# Patient Record
Sex: FEMALE | Race: WHITE | Employment: FULL TIME | URBAN - METROPOLITAN AREA
[De-identification: names, ages, dates, MRNs, and addresses within clinical notes are randomized per-mention and may not be internally consistent; named-entity substitution may affect disease eponyms.]

---

## 2017-01-18 PROBLEM — M25.462 KNEE EFFUSION, LEFT: Status: ACTIVE | Noted: 2017-01-18

## 2017-05-25 RX ORDER — DOXEPIN HYDROCHLORIDE 50 MG/1
1 CAPSULE ORAL DAILY
COMMUNITY
End: 2017-06-15

## 2017-05-25 RX ORDER — IBUPROFEN 200 MG
200 TABLET ORAL EVERY 6 HOURS PRN
COMMUNITY
End: 2017-06-15

## 2017-05-25 RX ORDER — NAPROXEN 250 MG/1
250 TABLET ORAL AS NEEDED
COMMUNITY
End: 2017-06-15

## 2017-06-15 PROCEDURE — 87624 HPV HI-RISK TYP POOLED RSLT: CPT | Performed by: INTERNAL MEDICINE

## 2017-06-15 PROCEDURE — 88175 CYTOPATH C/V AUTO FLUID REDO: CPT | Performed by: INTERNAL MEDICINE

## 2017-06-26 ENCOUNTER — APPOINTMENT (OUTPATIENT)
Dept: LAB | Facility: HOSPITAL | Age: 62
End: 2017-06-26
Attending: ORTHOPAEDIC SURGERY
Payer: COMMERCIAL

## 2017-06-26 ENCOUNTER — HOSPITAL ENCOUNTER (OUTPATIENT)
Dept: PHYSICAL THERAPY | Facility: HOSPITAL | Age: 62
Discharge: HOME | End: 2017-06-26
Attending: ORTHOPAEDIC SURGERY
Payer: COMMERCIAL

## 2017-06-26 DIAGNOSIS — M17.12 OSTEOARTHRITIS OF LEFT KNEE: ICD-10-CM

## 2017-06-26 PROCEDURE — 86850 RBC ANTIBODY SCREEN: CPT

## 2017-06-26 PROCEDURE — 85610 PROTHROMBIN TIME: CPT

## 2017-06-26 PROCEDURE — 85025 COMPLETE CBC W/AUTO DIFF WBC: CPT

## 2017-06-26 PROCEDURE — 80053 COMPREHEN METABOLIC PANEL: CPT

## 2017-06-26 PROCEDURE — 93010 ELECTROCARDIOGRAM REPORT: CPT | Performed by: INTERNAL MEDICINE

## 2017-06-26 PROCEDURE — 86901 BLOOD TYPING SEROLOGIC RH(D): CPT

## 2017-06-26 PROCEDURE — 86900 BLOOD TYPING SEROLOGIC ABO: CPT

## 2017-06-26 PROCEDURE — 87081 CULTURE SCREEN ONLY: CPT

## 2017-06-26 PROCEDURE — 93005 ELECTROCARDIOGRAM TRACING: CPT

## 2017-06-26 PROCEDURE — 36415 COLL VENOUS BLD VENIPUNCTURE: CPT

## 2017-06-26 PROCEDURE — 85730 THROMBOPLASTIN TIME PARTIAL: CPT

## 2017-07-10 ENCOUNTER — ANESTHESIA (OUTPATIENT)
Dept: SURGERY | Facility: HOSPITAL | Age: 62
DRG: 470 | End: 2017-07-10
Payer: COMMERCIAL

## 2017-07-10 ENCOUNTER — ANESTHESIA EVENT (OUTPATIENT)
Dept: SURGERY | Facility: HOSPITAL | Age: 62
DRG: 470 | End: 2017-07-10
Payer: COMMERCIAL

## 2017-07-10 ENCOUNTER — APPOINTMENT (OUTPATIENT)
Dept: GENERAL RADIOLOGY | Facility: HOSPITAL | Age: 62
DRG: 470 | End: 2017-07-10
Attending: ORTHOPAEDIC SURGERY
Payer: COMMERCIAL

## 2017-07-10 ENCOUNTER — HOSPITAL ENCOUNTER (INPATIENT)
Facility: HOSPITAL | Age: 62
LOS: 3 days | Discharge: HOME HEALTH | DRG: 470 | End: 2017-07-13
Attending: ORTHOPAEDIC SURGERY | Admitting: ORTHOPAEDIC SURGERY
Payer: COMMERCIAL

## 2017-07-10 ENCOUNTER — SURGERY (OUTPATIENT)
Age: 62
End: 2017-07-10

## 2017-07-10 DIAGNOSIS — M17.12 OSTEOARTHRITIS OF LEFT KNEE: Primary | ICD-10-CM

## 2017-07-10 PROCEDURE — 76942 ECHO GUIDE FOR BIOPSY: CPT | Performed by: ORTHOPAEDIC SURGERY

## 2017-07-10 PROCEDURE — 0SRD0J9 REPLACEMENT OF LEFT KNEE JOINT WITH SYNTHETIC SUBSTITUTE, CEMENTED, OPEN APPROACH: ICD-10-PCS | Performed by: ORTHOPAEDIC SURGERY

## 2017-07-10 PROCEDURE — 97161 PT EVAL LOW COMPLEX 20 MIN: CPT

## 2017-07-10 PROCEDURE — 97530 THERAPEUTIC ACTIVITIES: CPT

## 2017-07-10 PROCEDURE — 3E0T3CZ INTRODUCTION OF REGIONAL ANESTHETIC INTO PERIPHERAL NERVES AND PLEXI, PERCUTANEOUS APPROACH: ICD-10-PCS | Performed by: ANESTHESIOLOGY

## 2017-07-10 PROCEDURE — 88311 DECALCIFY TISSUE: CPT | Performed by: ORTHOPAEDIC SURGERY

## 2017-07-10 PROCEDURE — 73560 X-RAY EXAM OF KNEE 1 OR 2: CPT | Performed by: ORTHOPAEDIC SURGERY

## 2017-07-10 PROCEDURE — 88305 TISSUE EXAM BY PATHOLOGIST: CPT | Performed by: ORTHOPAEDIC SURGERY

## 2017-07-10 DEVICE — ATTUNE KNEE SYSTEM FEMORAL POSTERIOR STABILIZED NARROW SIZE 5N LEFT CEMENTED
Type: IMPLANTABLE DEVICE | Site: KNEE | Status: FUNCTIONAL
Brand: ATTUNE

## 2017-07-10 DEVICE — ATTUNE KNEE SYSTEM TIBIAL INSERT ROTATING PLATFORM POSTERIOR STABILIZED 5 6MM AOX
Type: IMPLANTABLE DEVICE | Site: KNEE | Status: FUNCTIONAL
Brand: ATTUNE

## 2017-07-10 DEVICE — CEMENT BONE RADIOPAQ HOWMEDICA: Type: IMPLANTABLE DEVICE | Site: KNEE | Status: FUNCTIONAL

## 2017-07-10 DEVICE — ATTUNE PATELLA MEDIALIZED ANATOMIC 35MM CEMENTED AOX
Type: IMPLANTABLE DEVICE | Site: KNEE | Status: FUNCTIONAL
Brand: ATTUNE

## 2017-07-10 DEVICE — ATTUNE KNEE SYSTEM TIBIAL BASE ROTATING PLATFORM SIZE 3 CEMENTED
Type: IMPLANTABLE DEVICE | Site: KNEE | Status: FUNCTIONAL
Brand: ATTUNE

## 2017-07-10 DEVICE — ATTUNE PINNING SYSTEM
Type: IMPLANTABLE DEVICE | Site: KNEE | Status: FUNCTIONAL
Brand: ATTUNE

## 2017-07-10 RX ORDER — HYDROMORPHONE HYDROCHLORIDE 1 MG/ML
0.8 INJECTION, SOLUTION INTRAMUSCULAR; INTRAVENOUS; SUBCUTANEOUS EVERY 2 HOUR PRN
Status: DISCONTINUED | OUTPATIENT
Start: 2017-07-10 | End: 2017-07-11

## 2017-07-10 RX ORDER — DOCUSATE SODIUM 100 MG/1
100 CAPSULE, LIQUID FILLED ORAL 2 TIMES DAILY
Status: DISCONTINUED | OUTPATIENT
Start: 2017-07-10 | End: 2017-07-13

## 2017-07-10 RX ORDER — DIPHENHYDRAMINE HYDROCHLORIDE 50 MG/ML
12.5 INJECTION INTRAMUSCULAR; INTRAVENOUS AS NEEDED
Status: DISCONTINUED | OUTPATIENT
Start: 2017-07-10 | End: 2017-07-10 | Stop reason: HOSPADM

## 2017-07-10 RX ORDER — SODIUM PHOSPHATE, DIBASIC AND SODIUM PHOSPHATE, MONOBASIC 7; 19 G/133ML; G/133ML
1 ENEMA RECTAL ONCE AS NEEDED
Status: DISCONTINUED | OUTPATIENT
Start: 2017-07-10 | End: 2017-07-13

## 2017-07-10 RX ORDER — SENNOSIDES 8.6 MG
17.2 TABLET ORAL NIGHTLY
Status: DISCONTINUED | OUTPATIENT
Start: 2017-07-10 | End: 2017-07-13

## 2017-07-10 RX ORDER — MEPERIDINE HYDROCHLORIDE 25 MG/ML
12.5 INJECTION INTRAMUSCULAR; INTRAVENOUS; SUBCUTANEOUS AS NEEDED
Status: DISCONTINUED | OUTPATIENT
Start: 2017-07-10 | End: 2017-07-10 | Stop reason: HOSPADM

## 2017-07-10 RX ORDER — MELATONIN
325
Status: DISCONTINUED | OUTPATIENT
Start: 2017-07-11 | End: 2017-07-13

## 2017-07-10 RX ORDER — OXYCODONE HYDROCHLORIDE 5 MG/1
5 TABLET ORAL EVERY 4 HOURS PRN
Status: DISCONTINUED | OUTPATIENT
Start: 2017-07-10 | End: 2017-07-11

## 2017-07-10 RX ORDER — OXYCODONE HYDROCHLORIDE 10 MG/1
10 TABLET ORAL EVERY 4 HOURS PRN
Status: DISCONTINUED | OUTPATIENT
Start: 2017-07-10 | End: 2017-07-11

## 2017-07-10 RX ORDER — ACETAMINOPHEN 325 MG/1
TABLET ORAL
Status: COMPLETED
Start: 2017-07-10 | End: 2017-07-10

## 2017-07-10 RX ORDER — MIDAZOLAM HYDROCHLORIDE 1 MG/ML
1 INJECTION INTRAMUSCULAR; INTRAVENOUS EVERY 5 MIN PRN
Status: DISCONTINUED | OUTPATIENT
Start: 2017-07-10 | End: 2017-07-10 | Stop reason: HOSPADM

## 2017-07-10 RX ORDER — OXYCODONE HCL 10 MG/1
10 TABLET, FILM COATED, EXTENDED RELEASE ORAL
Status: DISCONTINUED | OUTPATIENT
Start: 2017-07-10 | End: 2017-07-11

## 2017-07-10 RX ORDER — ZOLPIDEM TARTRATE 5 MG/1
5 TABLET ORAL NIGHTLY PRN
Status: DISCONTINUED | OUTPATIENT
Start: 2017-07-10 | End: 2017-07-11

## 2017-07-10 RX ORDER — CYCLOBENZAPRINE HCL 5 MG
5 TABLET ORAL 3 TIMES DAILY PRN
Status: DISCONTINUED | OUTPATIENT
Start: 2017-07-10 | End: 2017-07-11

## 2017-07-10 RX ORDER — HYDROMORPHONE HYDROCHLORIDE 1 MG/ML
0.4 INJECTION, SOLUTION INTRAMUSCULAR; INTRAVENOUS; SUBCUTANEOUS EVERY 2 HOUR PRN
Status: DISCONTINUED | OUTPATIENT
Start: 2017-07-10 | End: 2017-07-12

## 2017-07-10 RX ORDER — METOCLOPRAMIDE HYDROCHLORIDE 5 MG/ML
10 INJECTION INTRAMUSCULAR; INTRAVENOUS EVERY 6 HOURS PRN
Status: DISPENSED | OUTPATIENT
Start: 2017-07-10 | End: 2017-07-12

## 2017-07-10 RX ORDER — PSEUDOEPHEDRINE HCL 30 MG
100 TABLET ORAL 2 TIMES DAILY
Qty: 60 CAPSULE | Refills: 0 | Status: SHIPPED | OUTPATIENT
Start: 2017-07-10 | End: 2017-07-28

## 2017-07-10 RX ORDER — HYDROCODONE BITARTRATE AND ACETAMINOPHEN 10; 325 MG/1; MG/1
1-2 TABLET ORAL EVERY 6 HOURS PRN
Qty: 80 TABLET | Refills: 0 | Status: SHIPPED | OUTPATIENT
Start: 2017-07-17 | End: 2017-07-12

## 2017-07-10 RX ORDER — OXYCODONE HCL 10 MG/1
10 TABLET, FILM COATED, EXTENDED RELEASE ORAL
Status: COMPLETED | OUTPATIENT
Start: 2017-07-10 | End: 2017-07-10

## 2017-07-10 RX ORDER — BISACODYL 10 MG
10 SUPPOSITORY, RECTAL RECTAL
Status: DISCONTINUED | OUTPATIENT
Start: 2017-07-10 | End: 2017-07-13

## 2017-07-10 RX ORDER — DIPHENHYDRAMINE HYDROCHLORIDE 50 MG/ML
12.5 INJECTION INTRAMUSCULAR; INTRAVENOUS EVERY 4 HOURS PRN
Status: DISCONTINUED | OUTPATIENT
Start: 2017-07-10 | End: 2017-07-12

## 2017-07-10 RX ORDER — HYDROMORPHONE HYDROCHLORIDE 1 MG/ML
0.4 INJECTION, SOLUTION INTRAMUSCULAR; INTRAVENOUS; SUBCUTANEOUS EVERY 5 MIN PRN
Status: DISCONTINUED | OUTPATIENT
Start: 2017-07-10 | End: 2017-07-10 | Stop reason: HOSPADM

## 2017-07-10 RX ORDER — ONDANSETRON 2 MG/ML
4 INJECTION INTRAMUSCULAR; INTRAVENOUS EVERY 4 HOURS PRN
Status: DISCONTINUED | OUTPATIENT
Start: 2017-07-10 | End: 2017-07-13

## 2017-07-10 RX ORDER — DIPHENHYDRAMINE HYDROCHLORIDE 50 MG/ML
25 INJECTION INTRAMUSCULAR; INTRAVENOUS ONCE AS NEEDED
Status: ACTIVE | OUTPATIENT
Start: 2017-07-10 | End: 2017-07-10

## 2017-07-10 RX ORDER — SCOLOPAMINE TRANSDERMAL SYSTEM 1 MG/1
1 PATCH, EXTENDED RELEASE TRANSDERMAL ONCE
Status: DISCONTINUED | OUTPATIENT
Start: 2017-07-10 | End: 2017-07-13

## 2017-07-10 RX ORDER — ACETAMINOPHEN 325 MG/1
650 TABLET ORAL ONCE
Status: COMPLETED | OUTPATIENT
Start: 2017-07-10 | End: 2017-07-10

## 2017-07-10 RX ORDER — SODIUM CHLORIDE, SODIUM LACTATE, POTASSIUM CHLORIDE, CALCIUM CHLORIDE 600; 310; 30; 20 MG/100ML; MG/100ML; MG/100ML; MG/100ML
INJECTION, SOLUTION INTRAVENOUS CONTINUOUS
Status: DISCONTINUED | OUTPATIENT
Start: 2017-07-10 | End: 2017-07-10

## 2017-07-10 RX ORDER — NALOXONE HYDROCHLORIDE 0.4 MG/ML
80 INJECTION, SOLUTION INTRAMUSCULAR; INTRAVENOUS; SUBCUTANEOUS AS NEEDED
Status: DISCONTINUED | OUTPATIENT
Start: 2017-07-10 | End: 2017-07-10 | Stop reason: HOSPADM

## 2017-07-10 RX ORDER — HYDROMORPHONE HYDROCHLORIDE 1 MG/ML
0.2 INJECTION, SOLUTION INTRAMUSCULAR; INTRAVENOUS; SUBCUTANEOUS EVERY 2 HOUR PRN
Status: DISPENSED | OUTPATIENT
Start: 2017-07-10 | End: 2017-07-12

## 2017-07-10 RX ORDER — SODIUM CHLORIDE, SODIUM LACTATE, POTASSIUM CHLORIDE, CALCIUM CHLORIDE 600; 310; 30; 20 MG/100ML; MG/100ML; MG/100ML; MG/100ML
INJECTION, SOLUTION INTRAVENOUS CONTINUOUS
Status: DISCONTINUED | OUTPATIENT
Start: 2017-07-10 | End: 2017-07-13

## 2017-07-10 RX ORDER — DIPHENHYDRAMINE HCL 25 MG
25 CAPSULE ORAL EVERY 4 HOURS PRN
Status: DISCONTINUED | OUTPATIENT
Start: 2017-07-10 | End: 2017-07-11

## 2017-07-10 RX ORDER — ACETAMINOPHEN 325 MG/1
650 TABLET ORAL 4 TIMES DAILY
Status: DISCONTINUED | OUTPATIENT
Start: 2017-07-10 | End: 2017-07-11

## 2017-07-10 RX ORDER — ONDANSETRON 2 MG/ML
4 INJECTION INTRAMUSCULAR; INTRAVENOUS AS NEEDED
Status: DISCONTINUED | OUTPATIENT
Start: 2017-07-10 | End: 2017-07-10 | Stop reason: HOSPADM

## 2017-07-10 RX ORDER — HYDROCODONE BITARTRATE AND ACETAMINOPHEN 10; 325 MG/1; MG/1
1-2 TABLET ORAL EVERY 6 HOURS PRN
Qty: 80 TABLET | Refills: 0 | Status: SHIPPED | OUTPATIENT
Start: 2017-07-10 | End: 2017-07-12

## 2017-07-10 RX ORDER — OXYCODONE HYDROCHLORIDE 15 MG/1
15 TABLET ORAL EVERY 4 HOURS PRN
Status: DISCONTINUED | OUTPATIENT
Start: 2017-07-10 | End: 2017-07-11

## 2017-07-10 RX ORDER — POLYETHYLENE GLYCOL 3350 17 G/17G
17 POWDER, FOR SOLUTION ORAL DAILY PRN
Status: DISCONTINUED | OUTPATIENT
Start: 2017-07-10 | End: 2017-07-13

## 2017-07-10 RX ORDER — BUPIVACAINE HYDROCHLORIDE AND EPINEPHRINE 2.5; 5 MG/ML; UG/ML
INJECTION, SOLUTION EPIDURAL; INFILTRATION; INTRACAUDAL; PERINEURAL AS NEEDED
Status: DISCONTINUED | OUTPATIENT
Start: 2017-07-10 | End: 2017-07-10

## 2017-07-10 NOTE — ANESTHESIA POSTPROCEDURE EVALUATION
[facility] Ruthell Angle Patient Status:  Surgery Admit   Age/Gender 63year old female MRN [id number]   Location [address] [facility] Attending Allison Lomas MD   Hosp Day # 0 PCP Corine Kilpatrick MD       Anesthesia Post-op N

## 2017-07-10 NOTE — OPERATIVE REPORT
TOTAL KNEE OPERATIVE REPORT:  Juan David Sanon       [id number]     5/18/1955    PREOP DX: LEFT  KNEE PRIMARY OSTEOARTHRITIS  POSTOP DX:LEFT KNEE PRIMARY OSTEOARTHRITIS  PROCEDURE: LEFT TOTAL KNEE REPLACEMENT  SURGEON: MD FIRST Roz Fernandez FINISHED OFF WITH CHAMFER AND [address] CUTS IN USUAL FASHION. POSTERIOR FEMORAL OSTEOPHYTES WERE REMOVED. POSTERIOR CAPSULAR RELEASE WAS DONE CAREFULLY. PROXIMAL TIBIA WAS EXPOSED.   TIBIA WAS SIZED at 3 AND ROTATION WAS SET USING MEDIAL 1/3 OF TIBIA TUBE

## 2017-07-10 NOTE — PHYSICAL THERAPY NOTE
PHYSICAL THERAPY KNEE EVALUATION - INPATIENT     Room Number: 352/352-A  Evaluation Date: 7/10/2017  Type of Evaluation: Initial  Physician Order: PT Eval and Treat    Presenting Problem: S/p Left TKA on 07/10/17  Reason for Therapy: Mobility Dysfunction a Status:  Impaired    RANGE OF MOTION AND STRENGTH ASSESSMENT  Upper extremity ROM and strength are within functional limits     Lower extremity ROM is within functional limits Except Left knee ROM limited S/p Left TKA on 07/10/17    Lower extremity strengt Patient was able to perform left LE exercises as per TKA rehab protocol. Patient was able to perform Supine <> Sit with independence. Sit <> stand with RW & CGA to min assist for safety.   Patient ambulated with RW & min assist - by bedside & to beside commo education;Gait training;Neuromuscular re-educate;Range of motion;Strengthening;Stoop training;Stair training;Transfer training;Balance training  Rehab Potential : Good  Frequency (Obs): BID  Number of Visits to Meet Established Goals: 5    CURRENT GOALS

## 2017-07-10 NOTE — H&P
Office Visit     6/15/2017  Charbel Winters MD   Internal Medicine   Routine physical examination +4 more   Dx   Physical    Reason for Visit    Progress Notes        Geoffrey Mar is a 63year old female who presents for HEMATOLOGIC: denies hx of bleeding tendency in self or family members  ENDOCRINE: denies thyroid history  ALL/ASTHMA: denies hx of allergy or asthma        EXAM:   /56 mmHg  Pulse 76  Temp(Src) 97.8 °F (36.6 °C) (Oral)  Resp 12  Ht 5' 2.5\" (1.588 m) Patient’s diagnosis and treatment options were discussed. Conservative care vs surgical intervention including joint replacement options were discussed. Extensive need for physical therapy after surgery emphasized.   Hospital course, recovery process, exp

## 2017-07-10 NOTE — ANESTHESIA PREPROCEDURE EVALUATION
PRE-OP EVALUATION    Patient Name: Emerita Marcano    Pre-op Diagnosis: LEFT KNEE OSTEOARTHRITIS     Procedure(s):  LEFT TOTAL KNEE REPLACEMENT    Surgeon(s) and Role:     Mauricio Barcenas MD - Primary    Pre-op vitals reviewed.   Temp: 97.8 °F (36.6 °C) HCT 39.8 06/26/2017   MCV 87.1 06/26/2017   MCH 28.7 06/26/2017   MCHC 32.9 06/26/2017   RDW 12.8 06/26/2017   .0 06/26/2017       Lab Results  Component Value Date    06/26/2017   K 4.3 06/26/2017    06/26/2017   CO2 30.0 06/26/2017

## 2017-07-10 NOTE — PROGRESS NOTES
NURSING ADMISSION NOTE      Patient admitted via BED  Oriented to room. Safety precautions initiated. Bed in low position. Call light in reach. Alert/oriented. Numbness/tingling/decreased sensation to left lower leg.  Strong dorsi/plantar flex BLE

## 2017-07-10 NOTE — CONSULTS
[facility] Hospitalist Initial Consult       CC: post-op medical management s/p TKA    History of Present Illness: Patient is a 63year old female with PMH sig for OA who presents sp the above procedure.  She tolerated the procedure well without any immediate comp distress  Eyes: Pink conjunctivae, No ptosis  Neck: No masses, trachae midline  Pulm: Lungs clear bilaterally, normal respiratory effort  CV: Heart with regular rate and rhythm, no murmur  GI: Abdomen soft, nontender, nondistended, no organomegaly, bowel s

## 2017-07-11 LAB
ERYTHROCYTE [DISTWIDTH] IN BLOOD BY AUTOMATED COUNT: 12.4 % (ref 11.5–16)
HCT VFR BLD AUTO: 34.2 % (ref 34–50)
HGB BLD-MCNC: 11.5 G/DL (ref 12–16)
MCH RBC QN AUTO: 28.8 PG (ref 27–33.2)
MCHC RBC AUTO-ENTMCNC: 33.6 G/DL (ref 31–37)
MCV RBC AUTO: 85.5 FL (ref 81–100)
PLATELET # BLD AUTO: 183 10(3)UL (ref 150–450)
RBC # BLD AUTO: 4 X10(6)UL (ref 3.8–5.1)
RED CELL DISTRIBUTION WIDTH-SD: 38.8 FL (ref 35.1–46.3)
WBC # BLD AUTO: 12.7 X10(3) UL (ref 4–13)

## 2017-07-11 PROCEDURE — 97150 GROUP THERAPEUTIC PROCEDURES: CPT

## 2017-07-11 PROCEDURE — 85027 COMPLETE CBC AUTOMATED: CPT | Performed by: ORTHOPAEDIC SURGERY

## 2017-07-11 PROCEDURE — 97116 GAIT TRAINING THERAPY: CPT

## 2017-07-11 PROCEDURE — 97165 OT EVAL LOW COMPLEX 30 MIN: CPT

## 2017-07-11 PROCEDURE — 97535 SELF CARE MNGMENT TRAINING: CPT

## 2017-07-11 RX ORDER — HYDROCODONE BITARTRATE AND ACETAMINOPHEN 5; 325 MG/1; MG/1
2 TABLET ORAL EVERY 4 HOURS PRN
Status: DISCONTINUED | OUTPATIENT
Start: 2017-07-11 | End: 2017-07-12

## 2017-07-11 RX ORDER — HYDROCODONE BITARTRATE AND ACETAMINOPHEN 5; 325 MG/1; MG/1
1 TABLET ORAL EVERY 4 HOURS PRN
Status: DISCONTINUED | OUTPATIENT
Start: 2017-07-11 | End: 2017-07-12

## 2017-07-11 NOTE — OCCUPATIONAL THERAPY NOTE
OCCUPATIONAL THERAPY EVALUATION - INPATIENT     Room Number: 352/352-A  Evaluation Date: 7/11/2017  Type of Evaluation: Initial  Presenting Problem: s/p L TKA 7/10/17    Physician Order: IP Consult to Occupational Therapy  Reason for Therapy: ADL/IADL Dysf ASSESSMENT  Upper extremity ROM is within functional limits     Upper extremity strength is within functional limits     COORDINATION  Gross Motor    [facility]    Fine Motor    [facility]      ADDITIONAL TESTS                                    NEUROLOGICAL FINDINGS  N without AE but with increased time and cueing > standing via RW and min assist with safety cues > donning underwear and pants to waist min assist for balance in standing and increased time, also cues to place operative LE on ground as holding in air due to supervision

## 2017-07-11 NOTE — PLAN OF CARE
Became confused after Oxy 5 mg tab, anesthesia aware, Pain meds changed to [address] per [facility] pain service, bed alarm in place, daughter in room, alert to person & place.

## 2017-07-11 NOTE — CM/SW NOTE
07/11/17 1500   CM/SW Referral Data   Referral Source Physician   Reason for Referral Discharge planning   Informant Patient; Children;[facility] Staff  (dtr)   Pertinent Medical Hx   Primary Care Physician Name S. Marilyn Perez MD   Patient Info   Patient's Mental

## 2017-07-11 NOTE — PHYSICAL THERAPY NOTE
PHYSICAL THERAPY KNEE TREATMENT NOTE - INPATIENT     Room Number: 352/352-A     Session: 1   Number of Visits to Meet Established Goals: 5    Presenting Problem: S/p Left TKA on 07/10/17    Problem List  Active Problems:    Osteoarthritis of left knee assistance (actual CGA)  Distance (ft): 2  Assistive Device: Rolling walker  Pattern: Shuffle (unsafe to further amb due to drowsiness)  Stoop/Curb Assistance: Not tested  Comment : Above score is based on FIM definations    Skilled Therapy Provided:   AM: left TKA on 7/10/17.   Pt tolerated treatment well in am but presented with increased drowsiness and confusion in the PM.    Results of the AM-PAC \"6 clicks\" Inpatient Daily Mobility Short Form for the patient is 41.77% degree of basic mobility impairment

## 2017-07-11 NOTE — PROGRESS NOTES
Operative leg measured for tubigrip. Size E applied to left calf and size G applied to left knee. Patient instructed; verbalized understanding.

## 2017-07-11 NOTE — PAYOR COMM NOTE
--------------  ADMISSION REVIEW     Payor: Lucrecia Jaz #:  [id number]  Authorization Number: [id number]    Admit date: 7/10/2017  6:35 AM       Admitting Physician: Anahy Jerry MD  Attending Physician:  Anahy Jerry MD  Primary Care Physician:  To ENDOCRINE: denies thyroid history  ALL/ASTHMA: denies hx of allergy or asthma     EXAM:   /56 mmHg  Pulse 76  Temp(Src) 97.8 °F (36.6 °C) (Oral)  Resp 12  Ht 5' 2.5\" (1.588 m)  Wt 152 lb 3.2 oz (69.037 kg)  BMI 27.38 kg/m2  SpO2 98%  LMP  (Exact Fermin Patient’s diagnosis and treatment options were discussed. Conservative care vs surgical intervention including joint replacement options were discussed. Extensive need for physical therapy after surgery emphasized.   Hospital course, recovery process, exp PATIENT WAS CORRECTLY IDENTIFIED AND OPERATIVE SITE WAS VERIFIED IN THE PREOPERATIVE HOLDING AREA. PATIENT WAS BROUGHT TO THE OR AND WAS LAID IN SUPINE POSITION. PREOPERATIVE ANTIBIOTIC WAS GIVEN. NONOPERATIVE LEG HAD SCD APPLIED.    ANESTHESIA WAS ADMIN KNEE WAS FLEXED. FEMUR WAS SIZED AT 5. FEMORAL ROTATION WAS SET USING A TENSIONER. ANTERIOR AND POSTERIOR FEMORAL CUT WAS MADE. FLEXION AND EXTENSION GAPS WERE CHECKED USING RECTAGULAR SPACER BLOCK. GAPS WERE FOUND TO BE EQUAL.   VALGUS/VARUS STRESS TE Garrett Farfan MD  7/10/2017  10:08 AM        MEDICATIONS ADMINISTERED IN LAST 1 DAY:  acetaminophen (TYLENOL) tab 650 mg     Date Action Dose Route User    7/11/2017 0809 Given 650 mg Oral Judith Nicole RN    7/10/2017 2039 Given 650 mg Oral Von Loosen 7/10/2017 1448 Given 4 mg Intravenous Janae Vincent, RN      OxyCODONE HCl IR (ROXICODONE) immediate release tab 10 mg     Date Action Dose Route User    7/10/2017 2133 Given 10 mg Oral Kenna BRADFORD Hearn    7/10/2017 1755 Given 10 mg Oral Salida Yuniel

## 2017-07-11 NOTE — PROGRESS NOTES
Patient not seen today out of room. Chart reviewed and discussed with nurse that progressing well.      [facility]    rCuzito Rivas MD  [facility] Hospitalist  Pager: [phone]

## 2017-07-11 NOTE — PROGRESS NOTES
Post Op Day 1 Ortho Note    Status Post Nerve Block:  Type of Nerve Block: Left Femoral  Single Injection Nerve Block    Post op review: No evidence of immediate block related complications, No paresthesia noted, Able to plantar flex foot, Able to dorsifle

## 2017-07-11 NOTE — PROGRESS NOTES
Orthopedic surgery progress note    Zulema Castro Patient Status:  Inpatient    1955 MRN [id number]   [facility] 3SW-A Attending Es Franco MD   Hosp Day # 1 PCP Marion Pugh MD       Subjective: Moderate pain left knee.   No c

## 2017-07-12 LAB
ERYTHROCYTE [DISTWIDTH] IN BLOOD BY AUTOMATED COUNT: 12.6 % (ref 11.5–16)
HCT VFR BLD AUTO: 31.6 % (ref 34–50)
HGB BLD-MCNC: 10.8 G/DL (ref 12–16)
MCH RBC QN AUTO: 29.3 PG (ref 27–33.2)
MCHC RBC AUTO-ENTMCNC: 34.2 G/DL (ref 31–37)
MCV RBC AUTO: 85.6 FL (ref 81–100)
PLATELET # BLD AUTO: 179 10(3)UL (ref 150–450)
RBC # BLD AUTO: 3.69 X10(6)UL (ref 3.8–5.1)
RED CELL DISTRIBUTION WIDTH-SD: 38.9 FL (ref 35.1–46.3)
WBC # BLD AUTO: 12.6 X10(3) UL (ref 4–13)

## 2017-07-12 PROCEDURE — 93005 ELECTROCARDIOGRAM TRACING: CPT

## 2017-07-12 PROCEDURE — 85027 COMPLETE CBC AUTOMATED: CPT | Performed by: ORTHOPAEDIC SURGERY

## 2017-07-12 PROCEDURE — 97530 THERAPEUTIC ACTIVITIES: CPT

## 2017-07-12 PROCEDURE — 97535 SELF CARE MNGMENT TRAINING: CPT

## 2017-07-12 PROCEDURE — 93010 ELECTROCARDIOGRAM REPORT: CPT | Performed by: INTERNAL MEDICINE

## 2017-07-12 PROCEDURE — 97150 GROUP THERAPEUTIC PROCEDURES: CPT

## 2017-07-12 PROCEDURE — 97116 GAIT TRAINING THERAPY: CPT

## 2017-07-12 RX ORDER — TRAMADOL HYDROCHLORIDE 50 MG/1
50 TABLET ORAL EVERY 6 HOURS PRN
Qty: 80 TABLET | Refills: 1 | Status: SHIPPED | OUTPATIENT
Start: 2017-07-12 | End: 2017-07-13

## 2017-07-12 RX ORDER — SODIUM CHLORIDE 9 MG/ML
INJECTION, SOLUTION INTRAVENOUS CONTINUOUS
Status: DISCONTINUED | OUTPATIENT
Start: 2017-07-12 | End: 2017-07-13

## 2017-07-12 RX ORDER — ONDANSETRON 4 MG/1
4 TABLET, ORALLY DISINTEGRATING ORAL EVERY 6 HOURS PRN
Status: DISCONTINUED | OUTPATIENT
Start: 2017-07-12 | End: 2017-07-13

## 2017-07-12 RX ORDER — TRAMADOL HYDROCHLORIDE 50 MG/1
50 TABLET ORAL EVERY 6 HOURS PRN
Status: DISCONTINUED | OUTPATIENT
Start: 2017-07-12 | End: 2017-07-13

## 2017-07-12 NOTE — PROGRESS NOTES
Acute Pain Service    Post Op Day 2 Ortho Note    Assessed patient in bed. Patient states [address] is working well to manage pain; denies itching/nausea/dizziness. Patient able to bear weight on sx leg; equal sensation in BLE. No further recommendations.  Sonny Bank

## 2017-07-12 NOTE — PROGRESS NOTES
[facility] Hospitalist Progress Note     PCP: Zaida Al MD    SUBJECTIVE:  No CP, SOB, or N/V.    OBJECTIVE:  Temp:  [98 °F (36.7 °C)-99.7 °F (37.6 °C)] 98.6 °F (37 °C)  Pulse:  [] 104  Resp:  [16-20] 16  BP: ()/(58-71) 146/71    Intake/Output: left knee      S/P L TKA  - Post op care per ortho.  Continue PT/OT     Post-op pain   - Expected  - tramadol     ABLA - expected surgical blood loss     Confusion - likely related to narcotics after having received oxy 15mg. Resolved now.   I do not belie

## 2017-07-12 NOTE — PROGRESS NOTES
Patient presented semi-supine in bed; VSS and agreeable to participate. Performed BLE HEP/TherEX as recommended by PT. Upon completion, patient made comfortable in bed with call light in reach.

## 2017-07-12 NOTE — PAYOR COMM NOTE
--------------  CONTINUED STAY REVIEW    Payor: Guero Mom #:  [id number]  Authorization Number: [id number]    Admit date: 7/10/2017  6:35 AM       Admitting Physician: David Taveras MD  Attending Physician:  David Taveras MD  Primary Care Physician (none) Intravenous Deanna Masterson, BRADFORD      TraMADol HCl Ming Leopard) tab 50 mg     Date Action Dose Route User    7/12/2017 1232 Given 50 mg Oral Cameron Elias, BRADFORD          PLEASE REVIEW AND CONTINUE TO APPROVE ALL INPT DAYS OF THIS ADMISSION    PLEASE FA

## 2017-07-12 NOTE — PHYSICAL THERAPY NOTE
PHYSICAL THERAPY KNEE TREATMENT NOTE - INPATIENT     Room Number: 352/352-A     Session: 3  Number of Visits to Meet Established Goals: 5    Presenting Problem: S/p Left TKA on 07/10/17    Problem List  Active Problems:    Osteoarthritis of left knee Supervision  Distance (ft): 200  Assistive Device: Rolling walker  Pattern: L Decreased stance time  Stoop/Curb Assistance: Supervision (5 steps with two rails)  Comment : Above score is based on FIM definations    Skilled Therapy Provided:     AM: Pt part 28.97% degree of basic mobility impairment. The patient is below their baseline and would benefit from continued skilled PT to address the activity limitations identified by the AM-PAC \"6 clicks\".      At this time, Pt. presents with decreased balance, i

## 2017-07-12 NOTE — PROGRESS NOTES
Orthopedic surgery progress note    Tyler Hillman Patient Status:  Inpatient    1955 MRN [id number]   [facility] 3SW-A Attending Godwin Lee MD   Hosp Day # 2 PCP Jorge Jones MD       Subjective:  Pain is better controlled now.

## 2017-07-12 NOTE — OCCUPATIONAL THERAPY NOTE
OCCUPATIONAL THERAPY TREATMENT NOTE - INPATIENT     Room Number: 352/352-A  Session: 1   Number of Visits to Meet Established Goals: 3    Presenting Problem: s/p L TKA 7/10/17    History related to current admission: Patient with history of L knee OA, curr ASSESSMENT  Supine to Sit : Modified independent  Sit to Stand: Supervision    Skilled Therapy Provided: Supine > education on knee precautions and incorporation into ADLs > education on bed mobility > sitting EOB to L side Mod I with HOB flat > reinforcem training;Functional transfer training; Endurance training;Patient/Family education;Patient/Family training; Compensatory technique education  Rehab Potential : Good  Frequency (Obs): 5x/week    ADL GOALS   Patient will perform lower body dressing with superv

## 2017-07-12 NOTE — PROGRESS NOTES
Resumed care from [facility] at this time. Pt requesting to stay another night due to \"feeling dizzy and off\". Paged Dr. Meet Rosario to inform.

## 2017-07-12 NOTE — DISCHARGE SUMMARY
General Medicine Discharge Summary     Patient ID:  Zulema Castro  63year old  5/18/1955    Admit date: 7/10/2017    Discharge date and time: 7/12/17    Attending Physician: Es Franco MD     [facility] prosthesis  PATIENT STATED HISTORY: (As transcribed by Technologist)  Patient provided no additional information. FINDINGS:  Left total knee prosthesis in place. No fracture or dislocation. Soft tissue gas and skin staples anteriorly.       CONCLUSION: therapy). rivaroxaban 10 MG Oral Tab  Take 1 tablet (10 mg total) by mouth daily. docusate sodium 100 MG Oral Cap  Take 100 mg by mouth 2 (two) times daily.       CONTINUE these medications which have NOT CHANGED    Aspirin (SB LOW DOSE ASA EC) 81 MG

## 2017-07-12 NOTE — PROGRESS NOTES
07/12/17 1031   Clinical Encounter Type   Visited With Patient and family together   Continue Visiting No  ( available for continued care as needed/requested at pager [phone].)   Confucianism Encounters   Confucianism Needs ( provided prayer with

## 2017-07-12 NOTE — PLAN OF CARE
Around 0150, pt was assisted to the bathroom by the PCT. After voiding, pt stood up and took a couple of steps. Pt told the PCT that she felt dizzy and faint. The PCT placed the commode behind the pt for the pt to sit on.  Per the PCT, the pt then blacked o

## 2017-07-12 NOTE — HOME CARE LIAISON
DIAGNOSES AND PERTINENT MEDICAL HISTORY:  LEFT TKR   WT 69.9KG   HT  160CM    FACILITY NAME AND DC DATE:  [facility]  PENDING    BEDSIDE VISIT WITH:  TNL MET WITH PTNT AT BEDSIDE    SERVICES ORDERED:  SN/PT    VERIFIED PATIENT ADDRESS, PHONE NUMBER AND [facility]

## 2017-07-12 NOTE — HOME CARE LIAISON
MET WITH PTNT TO DISCUSS HOME HEALTH SERVICES AND COVERAGE CRITERIA. PTNT AGREEABLE TO Konrad Lopez. PTNT GIVEN RESIDENTIAL BROCHURE. RESIDENTIAL WITH PROVIDE SN/PT ON DISCHARGE.     Thank you for this referral,   Gay Sheth

## 2017-07-13 VITALS
HEIGHT: 63 IN | TEMPERATURE: 99 F | BODY MASS INDEX: 27.31 KG/M2 | RESPIRATION RATE: 20 BRPM | WEIGHT: 154.13 LBS | DIASTOLIC BLOOD PRESSURE: 65 MMHG | OXYGEN SATURATION: 98 % | SYSTOLIC BLOOD PRESSURE: 120 MMHG | HEART RATE: 108 BPM

## 2017-07-13 LAB
ERYTHROCYTE [DISTWIDTH] IN BLOOD BY AUTOMATED COUNT: 12.8 % (ref 11.5–16)
GLUCOSE BLD-MCNC: 166 MG/DL (ref 65–99)
HCT VFR BLD AUTO: 27.2 % (ref 34–50)
HGB BLD-MCNC: 9.2 G/DL (ref 12–16)
MCH RBC QN AUTO: 29.4 PG (ref 27–33.2)
MCHC RBC AUTO-ENTMCNC: 33.8 G/DL (ref 31–37)
MCV RBC AUTO: 86.9 FL (ref 81–100)
PLATELET # BLD AUTO: 168 10(3)UL (ref 150–450)
RBC # BLD AUTO: 3.13 X10(6)UL (ref 3.8–5.1)
RED CELL DISTRIBUTION WIDTH-SD: 40.9 FL (ref 35.1–46.3)
WBC # BLD AUTO: 10 X10(3) UL (ref 4–13)

## 2017-07-13 PROCEDURE — 97150 GROUP THERAPEUTIC PROCEDURES: CPT

## 2017-07-13 PROCEDURE — 93005 ELECTROCARDIOGRAM TRACING: CPT

## 2017-07-13 PROCEDURE — 93010 ELECTROCARDIOGRAM REPORT: CPT | Performed by: INTERNAL MEDICINE

## 2017-07-13 PROCEDURE — 85027 COMPLETE CBC AUTOMATED: CPT | Performed by: ORTHOPAEDIC SURGERY

## 2017-07-13 PROCEDURE — 82962 GLUCOSE BLOOD TEST: CPT

## 2017-07-13 PROCEDURE — 97116 GAIT TRAINING THERAPY: CPT

## 2017-07-13 RX ORDER — ACETAMINOPHEN AND CODEINE PHOSPHATE 300; 30 MG/1; MG/1
1 TABLET ORAL EVERY 4 HOURS PRN
Qty: 30 TABLET | Refills: 0 | Status: SHIPPED | OUTPATIENT
Start: 2017-07-13 | End: 2017-07-19

## 2017-07-13 RX ORDER — METOPROLOL SUCCINATE 50 MG/1
50 TABLET, EXTENDED RELEASE ORAL
Status: DISCONTINUED | OUTPATIENT
Start: 2017-07-13 | End: 2017-07-13

## 2017-07-13 RX ORDER — ACETAMINOPHEN AND CODEINE PHOSPHATE 300; 30 MG/1; MG/1
1 TABLET ORAL EVERY 4 HOURS PRN
Status: DISCONTINUED | OUTPATIENT
Start: 2017-07-13 | End: 2017-07-13

## 2017-07-13 RX ORDER — METOPROLOL SUCCINATE 25 MG/1
25 TABLET, EXTENDED RELEASE ORAL
Qty: 30 TABLET | Refills: 1 | Status: SHIPPED | OUTPATIENT
Start: 2017-07-13 | End: 2017-07-13

## 2017-07-13 RX ORDER — METOPROLOL SUCCINATE 25 MG/1
25 TABLET, EXTENDED RELEASE ORAL
Qty: 30 TABLET | Refills: 1 | Status: SHIPPED | OUTPATIENT
Start: 2017-07-13 | End: 2017-08-03 | Stop reason: ALTCHOICE

## 2017-07-13 RX ORDER — METOPROLOL SUCCINATE 25 MG/1
25 TABLET, EXTENDED RELEASE ORAL
Status: DISCONTINUED | OUTPATIENT
Start: 2017-07-13 | End: 2017-07-13

## 2017-07-13 NOTE — PROGRESS NOTES
Resumed care from [facility] at this time. Pt doing well, lying in bed. Daughter at bedside. Cardiology coming by for new consult. Dr. Louise Locke saw pt.

## 2017-07-13 NOTE — PROGRESS NOTES
Orthopedic surgery progress note    Oma Orozco Patient Status:  Inpatient    1955 MRN [id number]   [facility] 3SW-A Attending Allison Lomas MD   Hosp Day # 3 PCP Corine Kilpatrick MD       Subjective:  Pain controlled.  No calf pain,

## 2017-07-13 NOTE — PROGRESS NOTES
Pt discharged home with her daughter at this time. Scripts for metoprolol and tyleno #3 given to pt. Scripts for [employer]	[place], colace already picked up by pt's daughter at their pharmacy. Pt has all belongings. Pt taken via w/c to lobby by pt transport.   Pt armen

## 2017-07-13 NOTE — DISCHARGE SUMMARY
General Medicine Discharge Summary     Patient ID:  Micaela Nelson  63year old  5/18/1955    Admit date: 7/10/2017    Discharge date and time: 7/13/17    Attending Physician: Jerson Mina MD     [facility] Or 2 Views), Left (cpt=73560)    Result Date: 7/10/2017  PROCEDURE:  XR KNEE (1 OR 2 VIEWS), LEFT (CPT=73560)  COMPARISON:  None.   INDICATIONS:  Postop left knee prosthesis  PATIENT STATED HISTORY: (As transcribed by Technologist)  Patient provided no kaiden these medications    Acetaminophen-Codeine #3 300-30 MG Oral Tab  Take 1 tablet by mouth every 4 (four) hours as needed.     TraMADol HCl 50 MG Oral Tab  Take 1 tablet (50 mg total) by mouth every 6 (six) hours as needed for Pain (prior to stretching and th

## 2017-07-13 NOTE — PHYSICAL THERAPY NOTE
PHYSICAL THERAPY KNEE TREATMENT NOTE - INPATIENT     Room Number: 352/352-A     Session:4              :Number of Visits to Meet Established Goals: 5    Presenting Problem: S/p Left TKA on 07/10/17    Problem List  Active Problems:    Osteoarthritis of le Supervision  Distance (ft): 300  Assistive Device: Rolling walker  Pattern: L Decreased stance time  Stoop/Curb Assistance: Supervision  Comment : Above score is based on FIM definations    Skilled Therapy Provided:     AM: Pt participated in POD #3 group Mobility Short Form for the patient is 20.91% degree of basic mobility impairment. The patient is below their baseline and would benefit from continued skilled PT to address the activity limitations identified by the AM-PAC \"6 clicks\".      At this time,

## 2017-07-13 NOTE — PLAN OF CARE
mpaired Activities of Daily Living    ,• Achieve highest/safest level of independence in self care Progressing        Impaired Functional Mobility    • Achieve highest/safest level of mobility/gait Progressing        PAIN - ADULT    • Verbalizes/displays a

## 2017-07-13 NOTE — PLAN OF CARE
HR up to 115, run of SVT's x 2 episodes, Dr. Bradshaw Backer aware, Cardiology consulted- will see today, EKG in chart, Tele in place, tolerating Tylenol #3, d/c later today.

## 2017-07-13 NOTE — CONSULTS
[facility] Cardiology Consultation    Madison Valenzuela Patient Status:  Inpatient    1955 MRN [id number]   [facility] 3SW-A Attending Adelina Long MD   Hosp Day # 3 PCP Elisabeth Jackson MD     Reason for Consultation:  PSVT      History of Pre oz (69 kg)  05/25/17 : 155 lb (70.3 kg)       07/12/17  2330 07/13/17  0400 07/13/17  0700 07/13/17  1152   BP: 124/60  126/70 151/73   BP Location: Right arm  Left arm    Pulse:  104  105   Resp: 16 16 18 20   Temp: 99 °F (37.2 °C)  98.8 °F (37.1 °C) 97. 6

## 2017-07-14 LAB
ATRIAL RATE: 105 BPM
ATRIAL RATE: 90 BPM
ATRIAL RATE: 96 BPM
P AXIS: 49 DEGREES
P AXIS: 63 DEGREES
P AXIS: 67 DEGREES
P-R INTERVAL: 124 MS
P-R INTERVAL: 130 MS
P-R INTERVAL: 132 MS
Q-T INTERVAL: 346 MS
Q-T INTERVAL: 376 MS
Q-T INTERVAL: 380 MS
QRS DURATION: 84 MS
QRS DURATION: 88 MS
QRS DURATION: 90 MS
QTC CALCULATION (BEZET): 457 MS
QTC CALCULATION (BEZET): 464 MS
QTC CALCULATION (BEZET): 475 MS
R AXIS: 68 DEGREES
R AXIS: 69 DEGREES
R AXIS: 70 DEGREES
T AXIS: -35 DEGREES
T AXIS: 16 DEGREES
T AXIS: 16 DEGREES
VENTRICULAR RATE: 105 BPM
VENTRICULAR RATE: 90 BPM
VENTRICULAR RATE: 96 BPM

## 2017-07-14 NOTE — CM/SW NOTE
07/14/17 0700   Discharge disposition   Discharged to: Home-Health   Name of Facillity/Home Care/Hospice Residential   Discharge transportation Private car   DC 7/13/17

## 2017-07-17 ENCOUNTER — APPOINTMENT (OUTPATIENT)
Dept: ULTRASOUND IMAGING | Facility: HOSPITAL | Age: 62
End: 2017-07-17
Attending: EMERGENCY MEDICINE
Payer: COMMERCIAL

## 2017-07-17 ENCOUNTER — HOSPITAL ENCOUNTER (EMERGENCY)
Facility: HOSPITAL | Age: 62
Discharge: HOME | End: 2017-07-17
Attending: EMERGENCY MEDICINE
Payer: COMMERCIAL

## 2017-07-17 VITALS
HEART RATE: 90 BPM | HEIGHT: 62 IN | TEMPERATURE: 99 F | BODY MASS INDEX: 27.6 KG/M2 | OXYGEN SATURATION: 99 % | RESPIRATION RATE: 16 BRPM | WEIGHT: 150 LBS | DIASTOLIC BLOOD PRESSURE: 75 MMHG | SYSTOLIC BLOOD PRESSURE: 135 MMHG

## 2017-07-17 DIAGNOSIS — M79.605 LEFT LEG PAIN: Primary | ICD-10-CM

## 2017-07-17 PROCEDURE — 99284 EMERGENCY DEPT VISIT MOD MDM: CPT

## 2017-07-17 PROCEDURE — 93971 EXTREMITY STUDY: CPT | Performed by: EMERGENCY MEDICINE

## 2017-07-17 RX ORDER — MEDICAL SUPPLY, MISCELLANEOUS
EACH MISCELLANEOUS
Qty: 1 EACH | Refills: 0 | Status: SHIPPED | OUTPATIENT
Start: 2017-07-17 | End: 2018-12-03 | Stop reason: ALTCHOICE

## 2017-07-17 NOTE — ED PROVIDER NOTES
Patient Seen in: [facility] Emergency Department    History   Patient presents with:  Deep Vein Thrombosis (cardiovascular)    Stated Complaint: leg swelling r/o DVT s/p total knee replacement 1 week ago    HPI    This is a 42-year-old female who ha ago  Other systems are as noted in HPI. Constitutional and vital signs reviewed. All other systems reviewed and negative except as noted above. PSFH elements reviewed from today and agreed except as otherwise stated in HPI.     Physical Exam   ED T noted. No signs of compartment syndrome.     Disposition and Plan     Clinical Impression:  Left leg pain  (primary encounter diagnosis)    Disposition:  Discharge    Follow-up:  Kim Anderson, [address], [address] [phone]

## 2017-07-17 NOTE — ED INITIAL ASSESSMENT (HPI)
S/P left knee replacement x 1 week here today with swelling and bruising LLE sent by Dr. Cynthia Ham office to r/o DVT.

## 2017-07-18 NOTE — PAYOR COMM NOTE
--------------  DISCHARGE REVIEW    Payor: Mendez Hernandez #:  [id number]  Authorization Number: [id number]    Admit date: 7/10/2017  6:35 AM  Discharge Date: 7/13/2017  5:30 PM     Admitting Physician: Jerson Mina MD  Attending Physician:  Macy att.  pr CONSULT TO PAIN MANAGEMENT  IP CONSULT TO SPIRITUAL CARE  IP CONSULT TO SOCIAL WORK    Radiology reports:    Xr Knee (1 Or 2 Views), Left (cpt=73560)    Result Date: 7/10/2017  PROCEDURE:  XR KNEE (1 OR 2 VIEWS), LEFT (CPT=73560)  COMPARISON:  None.   Jodi Hernandez (LRB):  KNEE TOTAL REPLACEMENT (Left)     Patient instructions:    Current Discharge Medication List    START taking these medications    Acetaminophen-Codeine #3 300-30 MG Oral Tab  Take 1 tablet by mouth every 4 (four) hours as needed.     TraMADol HCl 50

## 2017-07-21 PROBLEM — M17.12 PRIMARY OSTEOARTHRITIS OF LEFT KNEE: Status: ACTIVE | Noted: 2017-07-21

## 2017-07-28 PROBLEM — Z96.652 S/P TOTAL KNEE ARTHROPLASTY, LEFT: Status: ACTIVE | Noted: 2017-07-28

## 2018-07-27 PROBLEM — M25.561 RIGHT KNEE PAIN, UNSPECIFIED CHRONICITY: Status: ACTIVE | Noted: 2018-07-27

## 2018-12-03 PROCEDURE — 86803 HEPATITIS C AB TEST: CPT | Performed by: INTERNAL MEDICINE

## 2020-01-13 PROBLEM — M17.11 PRIMARY OSTEOARTHRITIS OF RIGHT KNEE: Status: ACTIVE | Noted: 2020-01-13

## 2020-01-31 PROBLEM — M25.561 RIGHT KNEE PAIN, UNSPECIFIED CHRONICITY: Status: RESOLVED | Noted: 2018-07-27 | Resolved: 2020-01-31

## 2020-01-31 PROBLEM — M17.12 PRIMARY OSTEOARTHRITIS OF LEFT KNEE: Status: RESOLVED | Noted: 2017-07-21 | Resolved: 2020-01-31

## 2020-01-31 PROBLEM — M17.11 PRIMARY OSTEOARTHRITIS OF RIGHT KNEE: Status: RESOLVED | Noted: 2020-01-13 | Resolved: 2020-01-31

## 2020-02-27 PROBLEM — Z47.89 ORTHOPEDIC AFTERCARE: Status: ACTIVE | Noted: 2020-02-27

## 2020-03-16 PROBLEM — Z96.651 STATUS POST TOTAL RIGHT KNEE REPLACEMENT: Status: ACTIVE | Noted: 2020-03-16

## 2020-03-16 PROBLEM — M25.561 ACUTE PAIN OF RIGHT KNEE: Status: ACTIVE | Noted: 2020-03-16

## 2020-12-10 PROCEDURE — 88305 TISSUE EXAM BY PATHOLOGIST: CPT | Performed by: SPECIALIST

## 2021-04-24 ENCOUNTER — IMMUNIZATION (OUTPATIENT)
Dept: LAB | Age: 66
End: 2021-04-24

## 2021-04-24 DIAGNOSIS — Z23 NEED FOR VACCINATION: Primary | ICD-10-CM

## 2021-04-24 PROCEDURE — 91300 COVID 19 PFIZER-BIONTECH: CPT | Performed by: HOSPITALIST

## 2021-04-24 PROCEDURE — 0001A COVID 19 PFIZER-BIONTECH: CPT | Performed by: HOSPITALIST

## (undated) DEVICE — INSTRUMENT FEE

## (undated) DEVICE — CONVERTORS STOCKINETTE: Brand: CONVERTORS

## (undated) DEVICE — Device: Brand: STABLECUT®

## (undated) DEVICE — GOWN SURG AERO CHROME XXL

## (undated) DEVICE — WRAP COOLING KNEE W/ICE PILLOW

## (undated) DEVICE — HOOD, PEEL-AWAY: Brand: FLYTE

## (undated) DEVICE — SUTURE VICRYL 2-0 CP-1

## (undated) DEVICE — SOL  .9 1000ML BTL

## (undated) DEVICE — PADDING CAST COTTON  4

## (undated) DEVICE — TOTAL KNEE CDS: Brand: MEDLINE INDUSTRIES, INC.

## (undated) DEVICE — ZIMMER® STERILE DISPOSABLE TOURNIQUET CUFF WITH PLC, DUAL PORT, SINGLE BLADDER, 34 IN. (86 CM)

## (undated) DEVICE — KENDALL SCD EXPRESS SLEEVES, KNEE LENGTH, MEDIUM: Brand: KENDALL SCD

## (undated) DEVICE — DRAPE,U/SHT,SPLIT,FILM,60X84,STERILE: Brand: MEDLINE

## (undated) DEVICE — BANDAGE ELASTIC ACE 6\" X-LONG

## (undated) DEVICE — SYRINGE 30ML LL TIP

## (undated) DEVICE — 3M™ IOBAN™ 2 ANTIMICROBIAL INCISE DRAPE 6651EZ: Brand: IOBAN™ 2

## (undated) DEVICE — DRESSING AQUACEL AG 3.5X12

## (undated) DEVICE — STERILE POLYISOPRENE POWDER-FREE SURGICAL GLOVES: Brand: PROTEXIS

## (undated) DEVICE — CHLORAPREP 26ML APPLICATOR

## (undated) DEVICE — DECANTER BAG 9": Brand: MEDLINE INDUSTRIES, INC.

## (undated) DEVICE — BOWL CEMENT MIX QUICK-VAC

## (undated) DEVICE — Device: Brand: PLUMEPEN

## (undated) DEVICE — 3M™ MICROFOAM™ TAPE 1528-4: Brand: 3M™ MICROFOAM™

## (undated) DEVICE — 3M™ COBAN™ NL STERILE NON-LATEX SELF-ADHERENT WRAP, 2084S, 4 IN X 5 YD (10 CM X 4,5 M), 18 ROLLS/CASE: Brand: 3M™ COBAN™

## (undated) DEVICE — NEEDLE SPINAL 20X3-1/2 YELLOW

## (undated) DEVICE — SPECIMEN CONTAINER,POSITIVE SEAL INDICATOR, OR PACKAGED: Brand: PRECISION

## (undated) DEVICE — SUTURE STRATAFIX PDS PLUS 45CM

## (undated) NOTE — IP AVS SNAPSHOT
Patient Demographics     Address  [address] [place] [address]   [place] [address] Phone  [phone] [facility]) *Preferred*  [phone] (Work)  [phone] [facility]) E-mail Address  [email or web address]      Emergency Contact(s)     Name Relation Home Work Mobile    Ever ? For knee replacement surgery, follow instructions provided by physical therapy. ? Do NOT put a pillow under your knee as it may be more difficult to straighten afterwards. No smoking  ? Avoid smoking.  It is known to cause breathing problems and can d ? Surgical discomfort is normal for one to two months. ? Have realistic goals and keep a positive outlook. ? You may need pain medication regularly (every 4-6 hours) the first 2 weeks and then begin to decrease how often you are taking it.   ? Take pain m Prevention of infection and promotion of healing  ? Good hand washing is important. Everyone should wash their hands or use hand  as soon as they walk in your house-whether they live there or are visiting. ?  Keep bed linen/clothing freshly launde ? Increased pain at incision not relieved by pain medication. Signs of blood clot  ? Pain, excessive tenderness, redness, or swelling in leg or calf (other than incision site).   (CALL SURGEON)      Go directly to the ER or CALL 911 if  you:  ? b Bhupinder Lezama PA-C In 2 weeks.     Specialty:  Physician Assistant  Why:  Jae Dudley physician assistant  Contact information:  [address] [place] [address] Street [address] [place] [address] [phone]             Call Bartolo Brody MD.    Specialty:  Internal Med [id number] 0.9%  NaCl infusion 07/12/17 0301 New Bag      [id number] Senna (SENOKOT) tab TABS 17.2 mg 07/11/17 1953 Given      [id number] TraMADol HCl (ULTRAM) tab 50 mg 07/12/17 1232 Given      [id number] docusate sodium (COLACE) cap 100 mg 07/11/17 1954 Giv Eugene [address] [facility] [address] Carcamo  [address] [id number] 02/03/16 1801 - Present            Microbiology Results (All)     None         H&P - H&P Note      H&P signed by Radha Dasilva MD at 7/10/2017  7:02 AM  Version SKIN: denies any unusual skin lesions  EYES:denies blurred vision or double vision  HEENT: denies nasal congestion, sinus pain or ST  LUNGS: denies shortness of breath with exertion  CARDIOVASCULAR: denies chest pain on exertion  GI: denies abdominal pain, No major changes. [AK.1]    /76 (BP Location: Left arm)   Pulse 70   Temp 97.8 °F (36.6 °C) (Temporal)   Resp 20   Ht 5' 3\" (1.6 m)   Wt 154 lb 1.6 oz (69.9 kg)   LMP  (Exact Date)   SpO2 97%   BMI 27.74 kg/m²[AK.2]     left leg skin intact.   Both ca History of Present Illness: Patient is a[KT.3 63year old[KT. 3] female with PMH sig for OA who presents sp the above procedure. She tolerated the procedure well without any immediate complications.       Specifically, she denies N/V, lightheadness, chest p Gen: No acute distress  Eyes: Pink conjunctivae, No ptosis  Neck: No masses, trachae midline  Pulm: Lungs clear bilaterally, normal respiratory effort  CV: Heart with regular rate and rhythm, no murmur  GI: Abdomen soft, nontender, nondistended, no organ KT.5 - Dylon Batista MD on 7/11/2017  2:48 PM                        Discharge Summaries - D/C Summary      Discharge Summaries signed by Bethany Templeton DO at 7/12/2017 12:15 PM  Version 1 of 1    Author:  Bethany Templeton DO Service:  (none) Brittany Sarkar neurologic deficits.   No narcotics    Dispo - home with [facility]      Consults:[KM.1] IP CONSULT TO PAIN MANAGEMENT  IP CONSULT TO CASE MANAGEMENT  IP CONSULT TO SOCIAL WORK  IP CONSULT TO HOSPITALIST  IP CONSULT TO PAIN MANAGEMENT  IP CONSULT TO SPIRITUAL CARE Radiology has informed the patient of their results by letter and will contact the patient for needed follow-up studies. This was interpreted by Andrew Robins M.D.         Operative Procedures: Procedure(s) (LRB):  KNEE TOTAL REPLACEMENT (Left)       Serge Active Problems:    Osteoarthritis of left knee      Past Medical History   Past Medical History:   Diagnosis Date   • HSV infection    • Osteoarthritis        Past Surgical History  Past Surgical History:  :   : OTHER SURGICAL HISTORY      Com Skilled Therapy Provided:     AM: Pt participated in POD #2 group. Pt with much improved orientation and alertness. Pt completed seated/standing exercises with decreased assist for set-up and technique.  Pt able to tolerate increased repetitions today with At this time, Pt. presents with decreased balance, impaired strength, difficulty with gait/transfers resulting in downgrade of overall functional mobility.   Due to above deficits, Pt will benefit from continued IP PT, so that patient may achieve highest fu Active Problems:    Osteoarthritis of left knee[BR.2]      Past Medical History[BR.1]   Past Medical History:   Diagnosis Date   • HSV infection    • Osteoarthritis[BR.2]        Past Surgical History[BR.1]  Past Surgical History:  1996:   2008: OTHER S Pattern: Shuffle (unsafe to further amb due to drowsiness)  Stoop/Curb Assistance: Not tested  Comment : Above score is based on FIM definations[BR.2]    Skilled Therapy Provided:   AM: Pt seen for IPTE, and able to participate in AM group.  Pt completed se Pt tolerated treatment well in am but presented with increased drowsiness and confusion in the PM.    Results of the AM-PAC \"6 clicks\" Inpatient Daily Mobility Short Form for the patient is 41.77% degree of basic mobility impairment.   The patient is [facility] Author:  Kali Cazares PT Service:  Rehab Author Type:  Physical Therapist    Filed:  7/10/2017  3:07 PM Date of Service:  7/10/2017  2:55 PM Status:  Signed    :  Kali Cazares PT (Physical Therapist)       PHYSICAL THERAPY KNEE EVALUATION Ratin (5 @ rest,Increased with mobility)  Location: left knee @ surgical site  Management Techniques: Activity promotion; Body mechanics;Breathing techniques;Relaxation;Repositioning    COGNITION  · Overall Cognitive Status:  Impaired    RANGE OF MOTION Pattern: L Decreased stance time  Stoop/Curb Assistance: Not tested  Comment : Above score is based on FIM definations    Skilled Therapy Provided: Evaluation completed. Patient was instructed & educated in weight bearing status as WBAT on left LE.  Patient Research supports that patients with this level of impairment may benefit from  Home P.T.+ family assist as [id number]. DISCHARGE RECOMMENDATIONS  PT Discharge Recommendations: Home with home health PT    PLAN  PT Treatment Plan: Bed mobility; Endurance; En during hospital stay where BP drops and she gets dizzy and nauseous.      Problem List  Active Problems:    Osteoarthritis of left knee      Past Medical History  Past Medical History:   Diagnosis Date   • HSV infection    • Osteoarthritis        Past Surgi simulated LB dressing donning pants to knees and simulated donning socks/shoes SBA without AE via forward lean > standing via RW and SBA with safety cues > simulated donning pants to waist SBA for balance in standing > functional mobility to bathroom via R Patient will perform toileting with supervision --> MET 7/12/17    FUNCTIONAL TRANSFER GOALS   Patient will perform supine to sit with supervision --> MET 7/12/17  Patient will perform sit to supine with supervision --> MET 7/12/17  Patient will transfer t Patient Regularly Uses: Glasses[BW.2]    Prior Level of [place]: Patient reports independent in all I/ADL and functional mobility without device prior to admission.      SUBJECTIVE  \"Progress needs to be made, I can't be a baby\"    Patient self-state FUNCTIONAL TRANSFER ASSESSMENT[BW.1]  Supine to Sit : Not tested  Sit to Stand: Minimum assistance[BW. 2]    Skilled Therapy Provided: Patient received sitting up in chair, somewhat lethargic and reporting feeling mildly nauseous but agreeable to therapy > while performing lower body dressing/bathing, toileting, toilet transfers, bed mobility, functional mobility.  The patient is below baseline and would benefit from skilled inpatient OT to address the above deficits, maximizing patient's ability to return to [facility]TH[facility] - [facility]

## (undated) NOTE — ED AVS SNAPSHOT
[facility] Emergency Department  [place]  [address] [address]  Phone:  [phone]  Fax:  [phone]          Claudia Blair   MRN: [id number]    Department:  [facility] Emergency Department   Date of Visit:  7/17/2017 IF THERE IS ANY CHANGE OR WORSENING OF YOUR CONDITION, CALL YOUR PRIMARY CARE PHYSICIAN AT ONCE OR RETURN IMMEDIATELY TO THE EMERGENCY DEPARTMENT.     If you have been prescribed any medication(s), please fill your prescription right away and begin taking t